# Patient Record
Sex: MALE | Race: WHITE | HISPANIC OR LATINO | Employment: FULL TIME | URBAN - METROPOLITAN AREA
[De-identification: names, ages, dates, MRNs, and addresses within clinical notes are randomized per-mention and may not be internally consistent; named-entity substitution may affect disease eponyms.]

---

## 2021-07-23 ENCOUNTER — TELEPHONE (OUTPATIENT)
Dept: UROLOGY | Facility: AMBULATORY SURGERY CENTER | Age: 46
End: 2021-07-23

## 2021-07-23 NOTE — TELEPHONE ENCOUNTER
Reason for appointment/Complaint/Diagnosis : swelling of the penis foreskin  for 2 weeks    Insurance: Self pay  History of Cancer? no                 If yes, what kind? n/a  Previous urologist?no                Records requested/where?no  Outside testing/where? no  Location Preference for office visit?  [facility]

## 2021-07-23 NOTE — TELEPHONE ENCOUNTER
New patient calling to report he has been having foreskin discomfort for the last 2 months  Pt is uncircumcised  Foreskin is swollen at times, with intermittent redness  Pt states he is having difficulty pulling foreskin back  Pt has denied any discharge or bleeding  Pt has recently moved to [place] from [place]  Pt does a copy of his last physical to present to urology provider  Pt requesting a new patient appointment as soon as possible  An appointment has been scheduled for pt to be seen 7/26/21      Please be advised thank you

## 2021-07-26 ENCOUNTER — OFFICE VISIT (OUTPATIENT)
Dept: UROLOGY | Facility: MEDICAL CENTER | Age: 46
End: 2021-07-26

## 2021-07-26 VITALS — WEIGHT: 172 LBS | DIASTOLIC BLOOD PRESSURE: 82 MMHG | SYSTOLIC BLOOD PRESSURE: 126 MMHG

## 2021-07-26 DIAGNOSIS — N47.1 PHIMOSIS: Primary | ICD-10-CM

## 2021-07-26 PROCEDURE — 99203 OFFICE O/P NEW LOW 30 MIN: CPT | Performed by: UROLOGY

## 2021-07-26 NOTE — PROGRESS NOTES
HISTORY:    Young man with diabetes for years, concerned about tight foreskin for 3-6 months  He has tried various topical creams and antibiotic, steroid  Cannot really pull foreskin back, very painful, skin cracks     No other voiding symptoms  Good stream and control, denies prior episodes of infection         ASSESSMENT / PLAN:    I recommend circumcision  Pros and cons potential complications discussed     I recommend he talk to business office regarding insurance coverage    The following portions of the patient's history were reviewed and updated as appropriate: allergies, current medications, past family history, past medical history, past social history, past surgical history and problem list     Review of Systems   All other systems reviewed and are negative  Objective:     Physical Exam  Constitutional:       General: He is not in acute distress  Appearance: He is well-developed  He is not diaphoretic  HENT:      Head: Normocephalic and atraumatic  Eyes:      General: No scleral icterus  Pulmonary:      Effort: Pulmonary effort is normal    Genitourinary:     Comments: Tight phimosis, tip of foreskin cracked inflamed drainage     Testes  Skin:     Coloration: Skin is not pale  Neurological:      Mental Status: He is alert and oriented to person, place, and time  Psychiatric:         Behavior: Behavior normal          Thought Content: Thought content normal          Judgment: Judgment normal            No results found for: PSA]  No results found for: BUN  No results found for: CREATININE  No components found for: CBC      There is no problem list on file for this patient  Diagnoses and all orders for this visit:    Phimosis    Other orders  -     metFORMIN (GLUCOPHAGE) 500 mg tablet; Take 500 mg by mouth 2 (two) times a day with meals           Patient ID: Dimitris Beltrán is a 54 y o  male        Current Outpatient Medications:     metFORMIN (GLUCOPHAGE) 500 mg tablet, Take 500 mg by mouth 2 (two) times a day with meals, Disp: , Rfl:     Past Medical History:   Diagnosis Date    Diabetes mellitus ([facility] )        History reviewed  No pertinent surgical history      Social History

## 2021-07-29 ENCOUNTER — TELEPHONE (OUTPATIENT)
Dept: UROLOGY | Facility: CLINIC | Age: 46
End: 2021-07-29

## 2021-07-29 NOTE — TELEPHONE ENCOUNTER
Called patient in re to sched procedure and he informed me he would like to hold off for now, he will call us if he should need anything in the future